# Patient Record
Sex: MALE | Race: WHITE | Employment: FULL TIME | URBAN - METROPOLITAN AREA
[De-identification: names, ages, dates, MRNs, and addresses within clinical notes are randomized per-mention and may not be internally consistent; named-entity substitution may affect disease eponyms.]

---

## 2017-04-25 ENCOUNTER — OFFICE VISIT (OUTPATIENT)
Dept: INTERNAL MEDICINE | Age: 37
End: 2017-04-25
Attending: INTERNAL MEDICINE

## 2017-04-25 VITALS
OXYGEN SATURATION: 96 % | SYSTOLIC BLOOD PRESSURE: 120 MMHG | DIASTOLIC BLOOD PRESSURE: 83 MMHG | RESPIRATION RATE: 18 BRPM | HEART RATE: 96 BPM | TEMPERATURE: 97.7 F

## 2017-04-25 DIAGNOSIS — R51.9 FACIAL PAIN: Primary | ICD-10-CM

## 2017-04-25 RX ORDER — IBUPROFEN 800 MG/1
800 TABLET ORAL 3 TIMES DAILY
Qty: 21 TABLET | Refills: 0 | Status: SHIPPED | OUTPATIENT
Start: 2017-04-25 | End: 2018-03-21

## 2017-04-25 RX ORDER — CYCLOBENZAPRINE HCL 5 MG
TABLET ORAL
Qty: 60 TABLET | Refills: 0 | Status: SHIPPED | OUTPATIENT
Start: 2017-04-25 | End: 2017-05-09 | Stop reason: SDUPTHER

## 2017-04-25 RX ORDER — OXYCODONE HYDROCHLORIDE AND ACETAMINOPHEN 5; 325 MG/1; MG/1
1 TABLET ORAL EVERY 12 HOURS PRN
Qty: 15 TABLET | Refills: 0 | Status: SHIPPED | OUTPATIENT
Start: 2017-04-25 | End: 2017-05-09

## 2017-05-09 ENCOUNTER — OFFICE VISIT (OUTPATIENT)
Dept: INTERNAL MEDICINE | Age: 37
End: 2017-05-09
Attending: INTERNAL MEDICINE

## 2017-05-09 VITALS
RESPIRATION RATE: 18 BRPM | HEART RATE: 69 BPM | OXYGEN SATURATION: 99 % | WEIGHT: 211 LBS | BODY MASS INDEX: 28.62 KG/M2 | TEMPERATURE: 97.4 F | SYSTOLIC BLOOD PRESSURE: 129 MMHG | DIASTOLIC BLOOD PRESSURE: 86 MMHG

## 2017-05-09 DIAGNOSIS — R51.9 FACIAL PAIN: Primary | ICD-10-CM

## 2017-05-09 DIAGNOSIS — M62.830 BACK MUSCLE SPASM: ICD-10-CM

## 2017-05-09 RX ORDER — CYCLOBENZAPRINE HCL 5 MG
5 TABLET ORAL NIGHTLY PRN
Qty: 30 TABLET | Refills: 1 | Status: SHIPPED | OUTPATIENT
Start: 2017-05-09

## 2017-08-28 ENCOUNTER — OFFICE VISIT (OUTPATIENT)
Dept: INTERNAL MEDICINE | Age: 37
End: 2017-08-28
Attending: INTERNAL MEDICINE

## 2017-08-28 VITALS
DIASTOLIC BLOOD PRESSURE: 87 MMHG | SYSTOLIC BLOOD PRESSURE: 138 MMHG | RESPIRATION RATE: 16 BRPM | BODY MASS INDEX: 28.67 KG/M2 | OXYGEN SATURATION: 99 % | HEART RATE: 65 BPM | TEMPERATURE: 98.7 F | WEIGHT: 211.4 LBS

## 2017-08-28 DIAGNOSIS — M54.50 ACUTE BILATERAL LOW BACK PAIN WITHOUT SCIATICA: Primary | ICD-10-CM

## 2017-08-28 RX ORDER — CYCLOBENZAPRINE HCL 5 MG
5 TABLET ORAL NIGHTLY PRN
Qty: 15 TABLET | Refills: 0 | Status: SHIPPED | OUTPATIENT
Start: 2017-08-28 | End: 2017-09-12

## 2022-06-26 ENCOUNTER — APPOINTMENT (OUTPATIENT)
Dept: GENERAL RADIOLOGY | Age: 42
End: 2022-06-26
Payer: COMMERCIAL

## 2022-06-26 ENCOUNTER — HOSPITAL ENCOUNTER (EMERGENCY)
Age: 42
Discharge: HOME | End: 2022-06-26
Attending: EMERGENCY MEDICINE
Payer: COMMERCIAL

## 2022-06-26 VITALS
HEIGHT: 72 IN | DIASTOLIC BLOOD PRESSURE: 81 MMHG | OXYGEN SATURATION: 100 % | SYSTOLIC BLOOD PRESSURE: 138 MMHG | TEMPERATURE: 98.1 F | BODY MASS INDEX: 28.67 KG/M2 | HEART RATE: 66 BPM | RESPIRATION RATE: 16 BRPM

## 2022-06-26 DIAGNOSIS — S62.662A CLOSED NONDISPLACED FRACTURE OF DISTAL PHALANX OF RIGHT MIDDLE FINGER, INITIAL ENCOUNTER: Primary | ICD-10-CM

## 2022-06-26 PROCEDURE — 6370000000 HC RX 637 (ALT 250 FOR IP): Performed by: STUDENT IN AN ORGANIZED HEALTH CARE EDUCATION/TRAINING PROGRAM

## 2022-06-26 PROCEDURE — 99283 EMERGENCY DEPT VISIT LOW MDM: CPT

## 2022-06-26 PROCEDURE — 73130 X-RAY EXAM OF HAND: CPT

## 2022-06-26 PROCEDURE — 10060 I&D ABSCESS SIMPLE/SINGLE: CPT

## 2022-06-26 RX ORDER — IBUPROFEN 400 MG/1
400 TABLET ORAL ONCE
Status: COMPLETED | OUTPATIENT
Start: 2022-06-26 | End: 2022-06-26

## 2022-06-26 RX ORDER — ACETAMINOPHEN 325 MG/1
650 TABLET ORAL ONCE
Status: COMPLETED | OUTPATIENT
Start: 2022-06-26 | End: 2022-06-26

## 2022-06-26 RX ADMIN — ACETAMINOPHEN 650 MG: 325 TABLET ORAL at 19:43

## 2022-06-26 RX ADMIN — IBUPROFEN 400 MG: 400 TABLET, FILM COATED ORAL at 19:44

## 2022-06-26 ASSESSMENT — PAIN SCALES - GENERAL
PAINLEVEL_OUTOF10: 7
PAINLEVEL_OUTOF10: 7

## 2022-06-26 ASSESSMENT — ENCOUNTER SYMPTOMS
SHORTNESS OF BREATH: 0
ABDOMINAL PAIN: 0
EYE PAIN: 0
BACK PAIN: 0

## 2022-06-26 NOTE — ED PROVIDER NOTES
[id number]          EM RESIDENT NOTE       ED Attending Attestation Note     Date of evaluation: 6/26/2022    This patient was seen by the resident. I have seen and examined the patient, agree with the workup, evaluation, management and diagnosis. The care plan has been discussed. My assessment reveals   ED Course as of 06/27/22 1917   Ed [place] Jun 26, 202226, 2022 1918 Attending note: 37 yo Right hand dominant male with baseball injury to right middle finger hour prior to arrival.     Exam: Right hand with 2+ radial pulse. FROM of wrist w/o restriction. Nailbed of right middle finger intact with subungal hematoma; contusion to palmar surface of distal phalanx. FDS/FDP/extensor intact of middle finger. No ttp over DIP joint, PIP joint or MCP joint. Gross sensation intact in right hand in ulnar/median/radial nn distributions. Can form fist, no abnormal scissoring or rotation of digits; normal cascade of digits. No open wounds. Plan: Xrays, likely trephination of nail bed. Splint, Outpatient f/u with hand/sports medicine. Mary Wood MD, was present for the entire procedure of the right  middle finger nail trephination. [NM]      ED Course User Index  [NM] Nic Singh MD       Date of evaluation: 6/26/2022    Chief Complaint     Hand Injury (c/o right middle finger injury from a line drive ball . )      of Present Illness     Aura Michael is a 38 y.o. male who presents to the ED for a finger injury. Pt with a baseball drive directly to the finger at the tip. No injury anywhere else. No pain anywhere else. No no weakness numbness or tingling. Tetanus shot up-to-date. Review of Systems     Review of Systems   Constitutional: Negative for fever. HENT: Negative for ear pain. Eyes: Negative for pain and visual disturbance. Respiratory: Negative for shortness of breath. Cardiovascular: Negative for chest pain.    Gastrointestinal: Negative for abdominal pain. Genitourinary: Negative for flank pain. Musculoskeletal: Positive for joint swelling. Negative for back pain, neck pain and neck stiffness. Skin: Positive for wound. Neurological: Negative for weakness and light-headedness. Past Medical, Surgical, Family, and Social History     He has a past medical history of Kidney stone. He has no past surgical history on file. His family history is not on file. He reports that he has quit smoking. He has never used smokeless tobacco. He reports current alcohol use of about 1.0 standard drink of alcohol per week. He reports current drug use. Drug: Marijuana Kerri Dinning). Medications     Discharge Medication List as of 6/26/2022  8:26 PM      CONTINUE these medications which have NOT CHANGED    Details   ibuprofen (ADVIL;MOTRIN) 800 MG tablet Take 1 tablet by mouth 3 times daily for 7 days, Disp-21 tablet, R-0Print      cyclobenzaprine (FLEXERIL) 5 MG tablet Take 1 tablet by mouth nightly as needed for Muscle spasms, Disp-30 tablet, R-1Print      Multiple Vitamin (MULTI VITAMIN DAILY PO) Take by mouth             Allergies     He is allergic to penicillins. Physical Exam     INITIAL VITALS: BP: 138/81, Temp: 98.1 °F (36.7 °C), Heart Rate: 66, Resp: 16, SpO2: 100 %   Physical Exam  Vitals and nursing note reviewed. Constitutional:       Appearance: Normal appearance. HENT:      Head: Normocephalic and atraumatic. Right Ear: External ear normal.      Left Ear: External ear normal.      Nose: Nose normal.   Eyes:      Conjunctiva/sclera: Conjunctivae normal.   Cardiovascular:      Rate and Rhythm: Normal rate and regular rhythm. Pulmonary:      Effort: Pulmonary effort is normal.   Abdominal:      General: Abdomen is flat. Musculoskeletal:      Cervical back: Normal range of motion and neck supple. Comments: Right middle digit w/ bruising to nail and distal digit with open wound on dorsum over PIP but wo laceration. Extension/Flexion at DIP, PIP, MCP intact. Neurological:      Mental Status: He is alert. RECENT VITALS:  BP: 138/81, Temp: 98.1 °F (36.7 °C), Heart Rate: 66,Resp: 16, SpO2: 100 %     DiagnosticResults     RADIOLOGY:  XR HAND RIGHT (MIN 3 VIEWS)   Final Result      Small distal tuft avulsion fracture from the distal phalanx of the right middle finger without displacement                LABS:   No results found for this visit on 06/26/22. Procedures     Incision/Drainage    Date/Time: 6/26/2022 8:18 PM  Performed by: Alysia Zaragoza MD  Authorized by: Luiza Bull MD     Consent:     Consent obtained:  Verbal    Consent given by:  Patient    Risks discussed:  Incomplete drainage and pain    Alternatives discussed:  No treatment  Location:     Location:  Upper extremity    Upper extremity location:  Finger    Finger location:  R long finger  Pre-procedure details:     Skin preparation:  Chloraprep  Procedure type:     Procedure complexity: trephonation. Post-procedure details:     Patient tolerance of procedure: Tolerated well, no immediate complications        ED Course     Nursing Notes, Past Medical Hx, Past Surgical Hx, Social Hx, Allergies, and Family Hx were reviewed. The patient was given the followingmedications:  Orders Placed This Encounter   Medications    ibuprofen (ADVIL;MOTRIN) tablet 400 mg    acetaminophen (TYLENOL) tablet 650 mg       CONSULTS:  None    MEDICAL DECISION MAKING / ASSESSMENT / Garrett Sim is a 38 y.o. male presenting w/ right middle finger injury. Neurovascular intact with no concern for tendon injury. There is a nondisplaced tuft fracture. Given it is nondisplaced, my suspicion for nail bed injury is low. This was discussed with the patient option of nail removal for full evaluation expiration was discussed. Decided not to pursue that given low suspicion. Did provide drainage with trephination.   Splint placed and patient referred to orthopedic surgery. This patient was also evaluated by the attending physician. All care plans werediscussed and agreed upon. Clinical Impression     1.  Closed nondisplaced fracture of distal phalanx of right middle finger, initial encounter        Disposition     PATIENT REFERRED TO:  MD Nona Mccracken [address] [facility]  [phone]    In 1 week        DISCHARGE MEDICATIONS:  Discharge Medication List as of 6/26/2022  8:26 PM          DISPOSITION Decision To Discharge 06/26/2022 08:10:27 PM       Cora Rodriguez MD  Resident  06/26/22 2020       [address] [facility] MD Gabriel  06/27/22 2382

## 2022-06-27 NOTE — ED NOTES
Patient prepared for and ready to be discharged. Patient discharged at this time in no acute distress after verbalizing understanding of discharge instructions. Patient left after receiving After Visit Summary instructions.       Kimmy Villarreal RN  06/26/22 2031